# Patient Record
Sex: MALE | Race: WHITE | NOT HISPANIC OR LATINO
[De-identification: names, ages, dates, MRNs, and addresses within clinical notes are randomized per-mention and may not be internally consistent; named-entity substitution may affect disease eponyms.]

---

## 2018-08-30 ENCOUNTER — APPOINTMENT (OUTPATIENT)
Dept: CT IMAGING | Facility: IMAGING CENTER | Age: 73
End: 2018-08-30
Payer: MEDICARE

## 2018-08-30 ENCOUNTER — OUTPATIENT (OUTPATIENT)
Dept: OUTPATIENT SERVICES | Facility: HOSPITAL | Age: 73
LOS: 1 days | End: 2018-08-30
Payer: MEDICARE

## 2018-08-30 DIAGNOSIS — R31.0 GROSS HEMATURIA: ICD-10-CM

## 2018-08-30 DIAGNOSIS — Z00.8 ENCOUNTER FOR OTHER GENERAL EXAMINATION: ICD-10-CM

## 2018-08-30 PROCEDURE — 82565 ASSAY OF CREATININE: CPT

## 2018-08-30 PROCEDURE — 74178 CT ABD&PLV WO CNTR FLWD CNTR: CPT

## 2018-08-30 PROCEDURE — 74178 CT ABD&PLV WO CNTR FLWD CNTR: CPT | Mod: 26

## 2019-04-08 ENCOUNTER — APPOINTMENT (OUTPATIENT)
Dept: ORTHOPEDIC SURGERY | Facility: CLINIC | Age: 74
End: 2019-04-08
Payer: MEDICARE

## 2019-04-08 VITALS — HEIGHT: 69 IN | BODY MASS INDEX: 27.85 KG/M2 | WEIGHT: 188 LBS

## 2019-04-08 DIAGNOSIS — M17.11 UNILATERAL PRIMARY OSTEOARTHRITIS, RIGHT KNEE: ICD-10-CM

## 2019-04-08 DIAGNOSIS — M17.12 UNILATERAL PRIMARY OSTEOARTHRITIS, LEFT KNEE: ICD-10-CM

## 2019-04-08 PROCEDURE — 99203 OFFICE O/P NEW LOW 30 MIN: CPT

## 2019-04-08 PROCEDURE — 73565 X-RAY EXAM OF KNEES: CPT

## 2019-04-08 RX ORDER — MELOXICAM 15 MG/1
15 TABLET ORAL
Qty: 30 | Refills: 1 | Status: ACTIVE | COMMUNITY
Start: 2019-04-08 | End: 1900-01-01

## 2019-04-08 NOTE — DISCUSSION/SUMMARY
[de-identified] : 73yo M b/l knee moderate OA with pain L>R\par \par P:\par -Mobic Rx placed\par -PT for quad strengthening\par -activity as tolerated\par -f/u in 6wks, if pain not improved, consider corticosteroid injection as next step.

## 2019-04-08 NOTE — HISTORY OF PRESENT ILLNESS
[de-identified] : 73yo M s/p several years of b/l knee pain L>R and back cracking wthout pain. Denies any trauma. Was scheduled for PT and had been giong but had to stop due to his wife having had surgery with me recently. 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 States he has pain and weakness after prolonged walking. Can walk several blocks prior to stopping due to pain in knees. Has not attempted any other pain relieving measures. Denies numbness/tingling. Denies saddle anesthesia or bowel/bladder chagnes.

## 2019-04-08 NOTE — PHYSICAL EXAM
[de-identified] : NAD\par No midline spine tenderness to palpation\par No pain with ROM of L-spine, neg SLR\par \par b/l knees:\par Skin intact, no scars or evidence of previous surgery\par No pain with logrolling or axial loading\par Able to SLR\par Neg effusion, mild TTP about medial and lateral joint lines\par Stable v/v/a/p stresses neg lachman b/l\par 5/5 ta/ehl/gs\par SILT DPN/SPN/TN\par Toes warm and well perfused [de-identified] : XR of b/l knees WB AP, tunnel, lateral and sunrise views shows b/l valgus tricompartmental OA with joint space narrowing and osteophyte formation.

## 2024-03-26 ENCOUNTER — APPOINTMENT (OUTPATIENT)
Dept: OTOLARYNGOLOGY | Facility: CLINIC | Age: 79
End: 2024-03-26